# Patient Record
Sex: FEMALE | Race: WHITE | NOT HISPANIC OR LATINO | Employment: UNEMPLOYED | URBAN - METROPOLITAN AREA
[De-identification: names, ages, dates, MRNs, and addresses within clinical notes are randomized per-mention and may not be internally consistent; named-entity substitution may affect disease eponyms.]

---

## 2018-01-02 ENCOUNTER — TELEPHONE (OUTPATIENT)
Dept: TRANSPLANT | Facility: CLINIC | Age: 33
End: 2018-01-02

## 2018-01-02 NOTE — TELEPHONE ENCOUNTER
Incoming call from Zakia with [facility]. States appointment needed because patient has history of peripartum cardiomyopathy and would like to have a baby. Spoke with Gema CHOUDHURY in heart failure. Fax number to heart failure team given to Zakia to please send records so that appointment can be scheduled. Call PRN.

## 2018-01-02 NOTE — TELEPHONE ENCOUNTER
"Returned call to Zakia at [facility]. Left message with Onofre along with my contact information for Zakia to return call. Informed Onofre that this call is in regards to Zakia following up on a new referral for "heart failure" and that I neither the heart failure team have received any records on patient. Please have Zakia call me back.  "

## 2018-01-03 ENCOUNTER — TELEPHONE (OUTPATIENT)
Dept: TRANSPLANT | Facility: CLINIC | Age: 33
End: 2018-01-03

## 2018-01-15 ENCOUNTER — TELEPHONE (OUTPATIENT)
Dept: TRANSPLANT | Facility: CLINIC | Age: 33
End: 2018-01-15

## 2018-01-15 NOTE — TELEPHONE ENCOUNTER
"3:15 pm:  Have not heard back from pt. Called to speak with her at this time. [phone] and "voice mail box not yet set up"  Then called [phone] and male answered stating he is her . Asked to speak with Ms Roberta Avila, he asked who I was and then he stated, "she never called you back did she". I told hm she did not and since I never spoke with her directly , was trying to get in touch with her. 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 Stated he will text her and took my [phone] number. Said he will give her the message.   "

## 2018-01-23 ENCOUNTER — TELEPHONE (OUTPATIENT)
Dept: TRANSPLANT | Facility: CLINIC | Age: 33
End: 2018-01-23

## 2018-01-23 DIAGNOSIS — I50.22 CHRONIC SYSTOLIC HEART FAILURE: Primary | ICD-10-CM

## 2018-01-23 NOTE — TELEPHONE ENCOUNTER
1:05 pm:  Returned call to pt. Pt said her phone does not have voice message set up. Said she did get 2 messages from her  that I had called for her. She would like to set up an appt for a second opinion regarding her wanting to have another baby. Pt prefers to see Dr. Franco ( whom she saw back in 2013). Pt prefers a Friday appt and wants one the end of February or end of March. Stated she has seen her local Cardiologist this past April, had an echo and he told her he thought it safe to get pregnant. Her family practice MD and her OB doctor wants her to have a second opinion from a specialist. Told her we of course, can get her scheduled at any time she would like to be seen. Explained we would schedule an echo and lab work same day prior to this visit.  Pt in agreement with this. Pt scheduled at day and time of her preference : Feb. 23 rd for and echo @ 1100 am, non fasting labs right afterwards, and visit with Dr. Franco @ 1:00 pm.  Appt slip will be mailed to pts verified address.  Asked pt and she is no longer taking Coreg or Lisinopril. States she is taking Labetolol for a fast heart beat.     ----- Message from Rosa Maria Crowe sent at 1/23/2018 11:38 AM CST -----  Contact: jackson Ribeiro,    Pt is returning your call and can be reached at [phone].    Thank you

## 2023-11-01 DIAGNOSIS — I73.9 CLAUDICATION: Primary | ICD-10-CM
